# Patient Record
Sex: MALE | Race: WHITE | NOT HISPANIC OR LATINO | Employment: OTHER | URBAN - METROPOLITAN AREA
[De-identification: names, ages, dates, MRNs, and addresses within clinical notes are randomized per-mention and may not be internally consistent; named-entity substitution may affect disease eponyms.]

---

## 2023-05-25 PROBLEM — I25.10 CAD (CORONARY ARTERY DISEASE): Status: ACTIVE | Noted: 2023-05-25

## 2023-05-25 PROBLEM — Z95.1 HX OF CABG: Status: ACTIVE | Noted: 2023-05-25

## 2023-05-25 PROBLEM — C07 CANCER OF PAROTID GLAND: Status: ACTIVE | Noted: 2023-05-25

## 2023-05-25 PROBLEM — I50.9 HEART FAILURE: Status: ACTIVE | Noted: 2023-05-25

## 2023-05-28 ENCOUNTER — NURSE TRIAGE (OUTPATIENT)
Dept: ADMINISTRATIVE | Facility: CLINIC | Age: 68
End: 2023-05-28

## 2023-05-28 NOTE — TELEPHONE ENCOUNTER
Post-Discharge Day #1 completed with patient at this time. Patient states c/o worsening symptoms that include continued numbness at his left ear, cheek, face and neck. Patient also states c/o continued swelling.     On Call Provider, Dr. Hernan Connolly contacted and advised of patient's concerns regarding numbness and swelling at left side of face. Dr. Connolly states he will speak with patient at this time and states findings of numbness are WNL and will speak to patient regarding swelling. Conference/Transfer ot call to patient unsuccessful at this time due to voice messaging activation of patient's phone line. Dr. Connolly states he will contact patient directly to provide care advice.     Triage RN completed a follow-up call with patient's wife that states patient has spoken with Dr. Connolly and he provided care advice with appointment scheduled for Tuesday, 5/30/23.       Reason for Disposition   Condition / symptoms WORSE    Additional Information   Negative: Sounds like a life-threatening emergency to the triager   Negative: Discharged in past month from the hospital with a diagnosis of chronic obstructive pulmonic disease (COPD)   Negative: Discharged in past month from the hospital with a diagnosis of congestive heart failure (CHF) or heart failure (HF)   Negative: Discharged in past month from the hospital with a diagnosis of heart attack (myocardial infarction)   Negative: Discharged in past month from the hospital with a diagnosis of pneumonia   Negative: Taking antibiotic for cellulitis (follow-up call)   Negative: Taking antibiotic for other infection (follow-up call)   Negative: [1] Post-op AND [2] incision symptoms or questions   Negative: [1] Post-op AND [2] general symptoms or questions   Negative: Pain, redness, swelling, or pus at IV Site   Negative: IV not running or running slowly   Negative: Symptoms arising from use of a urinary catheter (e.g., Cou[place], [place])   Negative: Medication question    Negative: New-onset fever   Negative: [1] New symptom AND [2] not a possible complication of hospitalized condition   Negative: Patient sounds very sick or weak to the triager   Negative: Sounds like a serious complication to the triager    Protocols used: Post-Hospitalization Follow-up Call-A-

## 2024-01-19 PROBLEM — C73 PAPILLARY THYROID CARCINOMA: Status: ACTIVE | Noted: 2024-01-19
